# Patient Record
Sex: FEMALE | Race: NATIVE HAWAIIAN OR OTHER PACIFIC ISLANDER | NOT HISPANIC OR LATINO | URBAN - METROPOLITAN AREA
[De-identification: names, ages, dates, MRNs, and addresses within clinical notes are randomized per-mention and may not be internally consistent; named-entity substitution may affect disease eponyms.]

---

## 2023-05-18 ENCOUNTER — INPATIENT (INPATIENT)
Facility: HOSPITAL | Age: 28
LOS: 1 days | Discharge: HOME | DRG: 340 | End: 2023-05-20
Attending: SURGERY | Admitting: SURGERY
Payer: COMMERCIAL

## 2023-05-18 VITALS
HEIGHT: 66 IN | OXYGEN SATURATION: 96 % | DIASTOLIC BLOOD PRESSURE: 86 MMHG | SYSTOLIC BLOOD PRESSURE: 141 MMHG | TEMPERATURE: 100 F | HEART RATE: 90 BPM | WEIGHT: 169.98 LBS | RESPIRATION RATE: 18 BRPM

## 2023-05-18 LAB
ALBUMIN SERPL ELPH-MCNC: 4.8 G/DL — SIGNIFICANT CHANGE UP (ref 3.3–5)
ALP SERPL-CCNC: 59 U/L — SIGNIFICANT CHANGE UP (ref 40–120)
ALT FLD-CCNC: 19 U/L — SIGNIFICANT CHANGE UP (ref 10–45)
ANION GAP SERPL CALC-SCNC: 15 MMOL/L — SIGNIFICANT CHANGE UP (ref 5–17)
ANISOCYTOSIS BLD QL: SLIGHT — SIGNIFICANT CHANGE UP
APPEARANCE UR: CLEAR — SIGNIFICANT CHANGE UP
APTT BLD: 25.6 SEC — LOW (ref 27.5–35.5)
AST SERPL-CCNC: 16 U/L — SIGNIFICANT CHANGE UP (ref 10–40)
BACTERIA # UR AUTO: PRESENT /HPF
BASOPHILS # BLD AUTO: 0 K/UL — SIGNIFICANT CHANGE UP (ref 0–0.2)
BASOPHILS NFR BLD AUTO: 0 % — SIGNIFICANT CHANGE UP (ref 0–2)
BILIRUB SERPL-MCNC: 0.6 MG/DL — SIGNIFICANT CHANGE UP (ref 0.2–1.2)
BILIRUB UR-MCNC: NEGATIVE — SIGNIFICANT CHANGE UP
BLD GP AB SCN SERPL QL: NEGATIVE — SIGNIFICANT CHANGE UP
BLD GP AB SCN SERPL QL: NEGATIVE — SIGNIFICANT CHANGE UP
BUN SERPL-MCNC: 11 MG/DL — SIGNIFICANT CHANGE UP (ref 7–23)
CALCIUM SERPL-MCNC: 9.2 MG/DL — SIGNIFICANT CHANGE UP (ref 8.4–10.5)
CHLORIDE SERPL-SCNC: 99 MMOL/L — SIGNIFICANT CHANGE UP (ref 96–108)
CO2 SERPL-SCNC: 25 MMOL/L — SIGNIFICANT CHANGE UP (ref 22–31)
COLOR SPEC: YELLOW — SIGNIFICANT CHANGE UP
COMMENT - URINE: SIGNIFICANT CHANGE UP
CREAT SERPL-MCNC: 0.55 MG/DL — SIGNIFICANT CHANGE UP (ref 0.5–1.3)
DIFF PNL FLD: ABNORMAL
EGFR: 127 ML/MIN/1.73M2 — SIGNIFICANT CHANGE UP
EOSINOPHIL # BLD AUTO: 0 K/UL — SIGNIFICANT CHANGE UP (ref 0–0.5)
EOSINOPHIL NFR BLD AUTO: 0 % — SIGNIFICANT CHANGE UP (ref 0–6)
EPI CELLS # UR: SIGNIFICANT CHANGE UP /HPF (ref 0–5)
GLUCOSE SERPL-MCNC: 126 MG/DL — HIGH (ref 70–99)
GLUCOSE UR QL: NEGATIVE — SIGNIFICANT CHANGE UP
HCT VFR BLD CALC: 42 % — SIGNIFICANT CHANGE UP (ref 34.5–45)
HGB BLD-MCNC: 14.2 G/DL — SIGNIFICANT CHANGE UP (ref 11.5–15.5)
HYALINE CASTS # UR AUTO: SIGNIFICANT CHANGE UP /LPF (ref 0–2)
HYPOCHROMIA BLD QL: SLIGHT — SIGNIFICANT CHANGE UP
INR BLD: 1.21 — HIGH (ref 0.88–1.16)
KETONES UR-MCNC: >=80 MG/DL
LACTATE SERPL-SCNC: 1.5 MMOL/L — SIGNIFICANT CHANGE UP (ref 0.5–2)
LEUKOCYTE ESTERASE UR-ACNC: NEGATIVE — SIGNIFICANT CHANGE UP
LIDOCAIN IGE QN: 23 U/L — SIGNIFICANT CHANGE UP (ref 7–60)
LYMPHOCYTES # BLD AUTO: 0.24 K/UL — LOW (ref 1–3.3)
LYMPHOCYTES # BLD AUTO: 0.9 % — LOW (ref 13–44)
MAGNESIUM SERPL-MCNC: 1.5 MG/DL — LOW (ref 1.6–2.6)
MANUAL SMEAR VERIFICATION: SIGNIFICANT CHANGE UP
MCHC RBC-ENTMCNC: 29.9 PG — SIGNIFICANT CHANGE UP (ref 27–34)
MCHC RBC-ENTMCNC: 33.8 GM/DL — SIGNIFICANT CHANGE UP (ref 32–36)
MCV RBC AUTO: 88.4 FL — SIGNIFICANT CHANGE UP (ref 80–100)
MICROCYTES BLD QL: SLIGHT — SIGNIFICANT CHANGE UP
MONOCYTES # BLD AUTO: 0.9 K/UL — SIGNIFICANT CHANGE UP (ref 0–0.9)
MONOCYTES NFR BLD AUTO: 3.4 % — SIGNIFICANT CHANGE UP (ref 2–14)
NEUTROPHILS # BLD AUTO: 24.61 K/UL — HIGH (ref 1.8–7.4)
NEUTROPHILS NFR BLD AUTO: 93.1 % — HIGH (ref 43–77)
NITRITE UR-MCNC: NEGATIVE — SIGNIFICANT CHANGE UP
OVALOCYTES BLD QL SMEAR: SLIGHT — SIGNIFICANT CHANGE UP
PH UR: 6 — SIGNIFICANT CHANGE UP (ref 5–8)
PLAT MORPH BLD: ABNORMAL
PLATELET # BLD AUTO: 302 K/UL — SIGNIFICANT CHANGE UP (ref 150–400)
POIKILOCYTOSIS BLD QL AUTO: SLIGHT — SIGNIFICANT CHANGE UP
POTASSIUM SERPL-MCNC: 3.4 MMOL/L — LOW (ref 3.5–5.3)
POTASSIUM SERPL-SCNC: 3.4 MMOL/L — LOW (ref 3.5–5.3)
PROT SERPL-MCNC: 8.1 G/DL — SIGNIFICANT CHANGE UP (ref 6–8.3)
PROT UR-MCNC: 100 MG/DL
PROTHROM AB SERPL-ACNC: 14.4 SEC — HIGH (ref 10.5–13.4)
RBC # BLD: 4.75 M/UL — SIGNIFICANT CHANGE UP (ref 3.8–5.2)
RBC # FLD: 11.9 % — SIGNIFICANT CHANGE UP (ref 10.3–14.5)
RBC BLD AUTO: ABNORMAL
RBC CASTS # UR COMP ASSIST: < 5 /HPF — SIGNIFICANT CHANGE UP
RH IG SCN BLD-IMP: POSITIVE — SIGNIFICANT CHANGE UP
RH IG SCN BLD-IMP: POSITIVE — SIGNIFICANT CHANGE UP
ROULEAUX BLD QL SMEAR: PRESENT
SODIUM SERPL-SCNC: 139 MMOL/L — SIGNIFICANT CHANGE UP (ref 135–145)
SP GR SPEC: >=1.03 — SIGNIFICANT CHANGE UP (ref 1–1.03)
UROBILINOGEN FLD QL: 0.2 E.U./DL — SIGNIFICANT CHANGE UP
VARIANT LYMPHS # BLD: 2.6 % — SIGNIFICANT CHANGE UP (ref 0–6)
WBC # BLD: 26.43 K/UL — HIGH (ref 3.8–10.5)
WBC # FLD AUTO: 26.43 K/UL — HIGH (ref 3.8–10.5)
WBC UR QL: < 5 /HPF — SIGNIFICANT CHANGE UP

## 2023-05-18 PROCEDURE — 44970 LAPAROSCOPY APPENDECTOMY: CPT

## 2023-05-18 PROCEDURE — 99221 1ST HOSP IP/OBS SF/LOW 40: CPT | Mod: 57

## 2023-05-18 PROCEDURE — 99285 EMERGENCY DEPT VISIT HI MDM: CPT

## 2023-05-18 PROCEDURE — G1004: CPT

## 2023-05-18 PROCEDURE — 74177 CT ABD & PELVIS W/CONTRAST: CPT | Mod: 26,ME

## 2023-05-18 PROCEDURE — 88304 TISSUE EXAM BY PATHOLOGIST: CPT | Mod: 26

## 2023-05-18 DEVICE — STAPLER COVIDIEN TRI-STAPLE 60MM PURPLE RELOAD: Type: IMPLANTABLE DEVICE | Status: FUNCTIONAL

## 2023-05-18 RX ORDER — HEPARIN SODIUM 5000 [USP'U]/ML
5000 INJECTION INTRAVENOUS; SUBCUTANEOUS EVERY 8 HOURS
Refills: 0 | Status: DISCONTINUED | OUTPATIENT
Start: 2023-05-19 | End: 2023-05-20

## 2023-05-18 RX ORDER — PIPERACILLIN AND TAZOBACTAM 4; .5 G/20ML; G/20ML
3.38 INJECTION, POWDER, LYOPHILIZED, FOR SOLUTION INTRAVENOUS EVERY 8 HOURS
Refills: 0 | Status: DISCONTINUED | OUTPATIENT
Start: 2023-05-18 | End: 2023-05-20

## 2023-05-18 RX ORDER — SODIUM CHLORIDE 9 MG/ML
1000 INJECTION INTRAMUSCULAR; INTRAVENOUS; SUBCUTANEOUS ONCE
Refills: 0 | Status: COMPLETED | OUTPATIENT
Start: 2023-05-18 | End: 2023-05-18

## 2023-05-18 RX ORDER — HEPARIN SODIUM 5000 [USP'U]/ML
5000 INJECTION INTRAVENOUS; SUBCUTANEOUS EVERY 8 HOURS
Refills: 0 | Status: DISCONTINUED | OUTPATIENT
Start: 2023-05-18 | End: 2023-05-18

## 2023-05-18 RX ORDER — HYDROMORPHONE HYDROCHLORIDE 2 MG/ML
0.5 INJECTION INTRAMUSCULAR; INTRAVENOUS; SUBCUTANEOUS ONCE
Refills: 0 | Status: DISCONTINUED | OUTPATIENT
Start: 2023-05-18 | End: 2023-05-18

## 2023-05-18 RX ORDER — SODIUM CHLORIDE 9 MG/ML
1000 INJECTION, SOLUTION INTRAVENOUS ONCE
Refills: 0 | Status: COMPLETED | OUTPATIENT
Start: 2023-05-18 | End: 2023-05-18

## 2023-05-18 RX ORDER — ACETAMINOPHEN 500 MG
1000 TABLET ORAL ONCE
Refills: 0 | Status: COMPLETED | OUTPATIENT
Start: 2023-05-18 | End: 2023-05-18

## 2023-05-18 RX ORDER — MORPHINE SULFATE 50 MG/1
4 CAPSULE, EXTENDED RELEASE ORAL ONCE
Refills: 0 | Status: DISCONTINUED | OUTPATIENT
Start: 2023-05-18 | End: 2023-05-18

## 2023-05-18 RX ORDER — SODIUM CHLORIDE 9 MG/ML
1000 INJECTION, SOLUTION INTRAVENOUS
Refills: 0 | Status: DISCONTINUED | OUTPATIENT
Start: 2023-05-18 | End: 2023-05-18

## 2023-05-18 RX ORDER — ONDANSETRON 8 MG/1
4 TABLET, FILM COATED ORAL EVERY 6 HOURS
Refills: 0 | Status: DISCONTINUED | OUTPATIENT
Start: 2023-05-18 | End: 2023-05-18

## 2023-05-18 RX ORDER — OXYCODONE HYDROCHLORIDE 5 MG/1
5 TABLET ORAL EVERY 6 HOURS
Refills: 0 | Status: DISCONTINUED | OUTPATIENT
Start: 2023-05-18 | End: 2023-05-20

## 2023-05-18 RX ORDER — ONDANSETRON 8 MG/1
4 TABLET, FILM COATED ORAL EVERY 6 HOURS
Refills: 0 | Status: DISCONTINUED | OUTPATIENT
Start: 2023-05-18 | End: 2023-05-20

## 2023-05-18 RX ORDER — ACETAMINOPHEN 500 MG
650 TABLET ORAL EVERY 6 HOURS
Refills: 0 | Status: DISCONTINUED | OUTPATIENT
Start: 2023-05-19 | End: 2023-05-20

## 2023-05-18 RX ORDER — PIPERACILLIN AND TAZOBACTAM 4; .5 G/20ML; G/20ML
3.38 INJECTION, POWDER, LYOPHILIZED, FOR SOLUTION INTRAVENOUS ONCE
Refills: 0 | Status: COMPLETED | OUTPATIENT
Start: 2023-05-18 | End: 2023-05-18

## 2023-05-18 RX ORDER — MAGNESIUM SULFATE 500 MG/ML
2 VIAL (ML) INJECTION ONCE
Refills: 0 | Status: COMPLETED | OUTPATIENT
Start: 2023-05-18 | End: 2023-05-18

## 2023-05-18 RX ORDER — ONDANSETRON 8 MG/1
4 TABLET, FILM COATED ORAL ONCE
Refills: 0 | Status: COMPLETED | OUTPATIENT
Start: 2023-05-18 | End: 2023-05-18

## 2023-05-18 RX ORDER — PIPERACILLIN AND TAZOBACTAM 4; .5 G/20ML; G/20ML
3.38 INJECTION, POWDER, LYOPHILIZED, FOR SOLUTION INTRAVENOUS EVERY 8 HOURS
Refills: 0 | Status: CANCELLED | OUTPATIENT
Start: 2023-05-19 | End: 2023-05-18

## 2023-05-18 RX ADMIN — SODIUM CHLORIDE 1000 MILLILITER(S): 9 INJECTION, SOLUTION INTRAVENOUS at 17:33

## 2023-05-18 RX ADMIN — SODIUM CHLORIDE 1000 MILLILITER(S): 9 INJECTION INTRAMUSCULAR; INTRAVENOUS; SUBCUTANEOUS at 15:03

## 2023-05-18 RX ADMIN — Medication 25 GRAM(S): at 15:49

## 2023-05-18 RX ADMIN — SODIUM CHLORIDE 120 MILLILITER(S): 9 INJECTION, SOLUTION INTRAVENOUS at 17:33

## 2023-05-18 RX ADMIN — Medication 400 MILLIGRAM(S): at 17:47

## 2023-05-18 RX ADMIN — ONDANSETRON 4 MILLIGRAM(S): 8 TABLET, FILM COATED ORAL at 14:55

## 2023-05-18 RX ADMIN — MORPHINE SULFATE 4 MILLIGRAM(S): 50 CAPSULE, EXTENDED RELEASE ORAL at 14:34

## 2023-05-18 RX ADMIN — MORPHINE SULFATE 4 MILLIGRAM(S): 50 CAPSULE, EXTENDED RELEASE ORAL at 14:16

## 2023-05-18 RX ADMIN — PIPERACILLIN AND TAZOBACTAM 25 GRAM(S): 4; .5 INJECTION, POWDER, LYOPHILIZED, FOR SOLUTION INTRAVENOUS at 22:45

## 2023-05-18 RX ADMIN — HYDROMORPHONE HYDROCHLORIDE 0.5 MILLIGRAM(S): 2 INJECTION INTRAMUSCULAR; INTRAVENOUS; SUBCUTANEOUS at 17:47

## 2023-05-18 RX ADMIN — SODIUM CHLORIDE 1000 MILLILITER(S): 9 INJECTION INTRAMUSCULAR; INTRAVENOUS; SUBCUTANEOUS at 14:16

## 2023-05-18 RX ADMIN — PIPERACILLIN AND TAZOBACTAM 200 GRAM(S): 4; .5 INJECTION, POWDER, LYOPHILIZED, FOR SOLUTION INTRAVENOUS at 16:27

## 2023-05-18 NOTE — H&P ADULT - HISTORY OF PRESENT ILLNESS
28yo Female pt with no PMH/PSx presents to the ED on 5/18 with x1 day history of RLQ pain associated with nausea, vomiting, and diarrhea. Pt states that yesterday in the early afternoon she developed generalized upper abdominal pain associated with nausea and diarrhea which prompted her to leave work early. States that in the early evening, her nausea worsened and she began having multiple episodes of NBNB emesis. States that at that time her diarrhea resolved but she noticed she was no longer passing flatus. States that in the middle of the night she was woken by her pain which migrated to the RLQ and became sharp and stabbing in quality and 10/10 in severity. States she had a recorded fever of 101F associated with diaphoresis and chills. States that her mother who is a nurse, recommended she come in to the ED for further evaluation with concern for appendicitis. On presentation, pt reports that her pain remains localized to the RLQ and is still sharp and stabbing in nature. States that it is currently a 7/10 in severity (after 4mg morphine). Denies current nausea. States her last episode of emesis was in the waiting room of the ED a few hours ago. Denies further episodes of flatus or diarrhea since last night.    Last colonoscopy/EGD - Never  Denies family hx of IBS, Crohn's, or colon cancer.    In the ED, pt afebrile with low grade temp of 99.9 oral, nontachycardic, normotensive, and satting on RA. Labs significant for WBC 26.43, Hb 14.2, and an unremarkable chem panel. CTAP with PO/IV contrast demonstrates the appendix is dilated up to 1.4 cm diameter with wall thickening. Periappendiceal fat infiltration. Appears to be a 1.1 cm obstructing radiopaque appendicolith at the base of the appendix. More distally there appear to be multiple smaller radiopaque intraluminal appendicoliths as well as air within the terminal appendix. The appendix is situated retrocecal. No macro perforation. No free intraperitoneal air.    PMH: None  PSx: None  Social Hx: Never smoker, social Etoh, occassional marijuana  Family Hx: Materal grandmother with Ulcerative colitis    Meds: None

## 2023-05-18 NOTE — BRIEF OPERATIVE NOTE - OPERATION/FINDINGS
Patient was placed under general anesthesia and laparoscopic access obtained by supraumbilical midline cutdown with two other ports on the left and right abdomen. Appendix identified (non-perforated, gangrenous). Cecum bluntly mobilized. Mesoappendix divided using tan EndoGIA stapler. Appendix amputated at base near cecum using purple EndoGIA stapler. Stump inspected laparoscopically. Appendix was removed without incident and fascia closed w/ Vicryl sutures. Appendix gangrenous, no christoph perforation, likely microperforation (complicated appendicitis).

## 2023-05-18 NOTE — ED PROVIDER NOTE - OBJECTIVE STATEMENT
27-year-old female without significant medical history yet developed abdominal pain yesterday afternoon with multiple episodes of watery nonbloody diarrhea.  Pain was initially located in the periumbilical region but during the night it migrated down to the right lower quadrant.  She has no appetite today and she has vomited nonbloody none bilious emesis a couple of times.  She has only had 1 episode of diarrhea today.  She had a fever of 101 in the middle of the night.  Pain is worse with walking and the car ride to the ED was particularly painful for her when the car hit bumps.  Curling into fetal position helped relieve the pain slightly.    She is currently menstruating.    PMHx denies  PSHx denies  Meds denies  NKDA

## 2023-05-18 NOTE — ED PROVIDER NOTE - PHYSICAL EXAMINATION
CONSTITUTIONAL: NAD   SKIN: Normal color and turgor.    HEAD: NC/AT.  EYES: Conjunctiva clear. EOMI. PERRL.    ENT: Airway clear. Normal voice.   RESPIRATORY:  Normal work of breathing. Lungs CTAB.  CARDIOVASCULAR:  RRR, S1S2. No M/R/G.      GI:  Abdomen soft, very tender RLQ.  No rebound.  + Rovsing, + Psoas  : No CVAT.  MSK: Neck supple.  No LE edema or calf tenderness. No joint swelling or ROM limitation.  NEURO: Alert; CN: grossly intact. Speech clear.  DELCID. Gait steady.

## 2023-05-18 NOTE — ED PROVIDER NOTE - ATTENDING APP SHARED VISIT CONTRIBUTION OF CARE
27-year-old female without significant medical history yet developed abdominal pain yesterday afternoon with multiple episodes of watery nonbloody diarrhea.  Minimal RLQ ttp, no r/g Will get labs, UA, CT for ro appy, reassess.

## 2023-05-18 NOTE — ED ADULT TRIAGE NOTE - CHIEF COMPLAINT QUOTE
Pt co abdominal pain x1 day. Pain originally epigastric pain, now radiated to RLQ, associated w N/V/D. No abdominal surgeries, LMP 5/15.

## 2023-05-18 NOTE — ED PROVIDER NOTE - CLINICAL SUMMARY MEDICAL DECISION MAKING FREE TEXT BOX
pt with abd pain initially periumbilical, migrated to RLQ. assoc anorexia, NVD, and fever at home. suspicious for appendicitis, confirmed by CT scan. IV fluid, abx, mag repletion.

## 2023-05-18 NOTE — ED ADULT NURSE NOTE - OBJECTIVE STATEMENT
Pt reports RLQ abdominal pain w/ N/V x 24 hours. pt reports additional pain radiation to her epigastric area. Pain increases on ambulation. Denies any major medical history. Alert, oriented, and ambulatory with no mobility deficits identified.

## 2023-05-18 NOTE — CHART NOTE - NSCHARTNOTEFT_GEN_A_CORE
Procedure: laparoscopic appendectomy, gangrenous, nonperforated (5/18)  Surgeon: Dr. Deluca    S: Pt reports mild pain. NPO, -n/-v/-BF yet. Voiding adequately. VS wnl, satting well on 2L NC, normocardic. Abdomen is soft, mild appropriate ttp, incisions c/d/i. Denies CP, SOB, KLINE, calf tenderness.     O:  T(C): --  T(F): --  HR: 71 (05-19-23 @ 00:00) (68 - 97)  BP: 112/56 (05-19-23 @ 00:00) (107/57 - 122/57)  RR: 15 (05-19-23 @ 00:00) (11 - 16)  SpO2: 98% (05-19-23 @ 00:00) (96% - 98%)  Wt(kg): --                        14.2   26.43 )-----------( 302      ( 18 May 2023 13:51 )             42.0     05-18    139  |  99  |  11  ----------------------------<  126<H>  3.4<L>   |  25  |  0.55    Ca    9.2      18 May 2023 13:51  Mg     1.5     05-18    TPro  8.1  /  Alb  4.8  /  TBili  0.6  /  DBili  x   /  AST  16  /  ALT  19  /  AlkPhos  59  05-18      Gen: NAD, resting comfortably in bed  C/V: NSR  Pulm: Nonlabored breathing, no respiratory distress. Satting well on 2L NC.   Abd: soft, Mild appropriate ttp around incisions. Incisions c/d/i.   Extrem: WWP, no calf edema, SCDs in place      A/P: 27yFemale s/p above procedure  Diet: NPO/IVF  Pain/nausea control  Abx x4 days  DVT ppx: SQH, SCDs  Dispo plan: tele as per anesthesia

## 2023-05-18 NOTE — ED ADULT NURSE NOTE - NSICDXPASTSURGICALHX_GEN_ALL_CORE_FT
You can access the FollowMyHealth Patient Portal offered by [facility] by registering at the following website: [email or web address]. By joining [facility]’s FollowMyHealth portal, you will also be able to view your health information using other applications (apps) compatible with our system.
PAST SURGICAL HISTORY:  No significant past surgical history

## 2023-05-18 NOTE — H&P ADULT - ASSESSMENT
26yo Female pt with no PMH/PSx presents to the ED on 5/18 with x1 day history of RLQ pain associated with nausea, vomiting, and diarrhea.  Pt afebrile with low grade temp of 99.9 oral, nontachycardic, normotensive, and satting well on RA. Labs significant for WBC 26.43, Hb 14.2, and an unremarkable chem panel. CTAP with PO/IV contrast demonstrates acute retrocecal appendicitis.    Plan:  Admit to [facility], Team 4, Dr. Deluca  NPO/IVF  Zosyn  Pain/nausea control PRN  Home meds as appropriate  HSQ/SCDs/OOB/IS  Pre-op labs  Add on to OR for laparoscopic appendectomy

## 2023-05-18 NOTE — ED ADULT NURSE NOTE - NSFALLUNIVINTERV_ED_ALL_ED
Bed/Stretcher in lowest position, wheels locked, appropriate side rails in place/Call bell, personal items and telephone in reach/Instruct patient to call for assistance before getting out of bed/chair/stretcher/Non-slip footwear applied when patient is off stretcher/[place] to call system/Physically safe environment - no spills, clutter or unnecessary equipment/Purposeful proactive rounding/Room/bathroom lighting operational, light cord in reach

## 2023-05-18 NOTE — H&P ADULT - NSHPPHYSICALEXAM_GEN_ALL_CORE
Vital Signs Last 24 Hrs  T(C): 37.7 (18 May 2023 12:08), Max: 37.7 (18 May 2023 12:08)  T(F): 99.9 (18 May 2023 12:08), Max: 99.9 (18 May 2023 12:08)  HR: 90 (18 May 2023 12:08) (90 - 90)  BP: 141/86 (18 May 2023 12:08) (141/86 - 141/86)  BP(mean): --  RR: 18 (18 May 2023 12:08) (18 - 18)  SpO2: 96% (18 May 2023 12:08) (96% - 96%)    Parameters below as of 18 May 2023 12:08  Patient On (Oxygen Delivery Method): room air    Physical Exam  General: AAOx3, NAD, laying comfortably in bed  Cardio: S1,S2, No MRG  Pulm: Nonlabored breathing  Abdomen: soft, mildly distended, moderate RLQ TTP with +rebound, +Rovsing sign, mild guarding  Extremities: WWP, peripheral pulses appreciated

## 2023-05-18 NOTE — PRE-OP CHECKLIST - SELECT TESTS ORDERED
Type and Cross/Type and Screen/HCG/EKG/COVID-19 PT/PTT/INR/Type and Cross/Type and Screen/HCG/EKG/COVID-19

## 2023-05-18 NOTE — ED PROVIDER NOTE - NS ED ATTENDING STATEMENT MOD
This was a shared visit with the HAILEY. I reviewed and verified the documentation and independently performed the documented:

## 2023-05-18 NOTE — H&P ADULT - NSHPLABSRESULTS_GEN_ALL_CORE
LABS:                        14.2   26.43 )-----------( 302      ( 18 May 2023 13:51 )             42.0     05-18    139  |  99  |  11  ----------------------------<  126<H>  3.4<L>   |  25  |  0.55    Ca    9.2      18 May 2023 13:51  Mg     1.5     05-18    TPro  8.1  /  Alb  4.8  /  TBili  0.6  /  DBili  x   /  AST  16  /  ALT  19  /  AlkPhos  59  05-18        ACC: [id number] EXAM: CT ABDOMEN AND PELVIS OC IC ORDERED BY: ZENA SHI    PROCEDURE DATE: 05/18/2023        INTERPRETATION: CONTRAST/COMPLICATIONS:  IV Contrast: Isovue 370 90 cc administered 10 cc discarded  Oral Contrast: Omnipaque 300  Complications: None reported at time of study completion    CT of the ABDOMEN and PELVIS with intravenous contrast dated 5/18/2023 3:48 PM    INDICATION: RLQ abdominal pain, appendicitis suspected    TECHNIQUE: CT of the abdomen and pelvis was performed. Axial and coronal and sagittal images were produced and reviewed.    PRIOR STUDIES: None.    FINDINGS: Images of the lower chest demonstrate no abnormality.    The liver is normal in appearance.     No radiopaque stones are seen in the gallbladder.     The pancreas is normal in appearance.     No splenic abnormalities are seen.    The adrenal glands are unremarkable.    The kidneys appear to show some asymmetry in size with the right kidney appearing to be larger than the left. No hydronephrosis. No nephrolithiasis..    No abdominal aortic aneurysm is seen.     No lymphadenopathy is seen.    Evaluation of the bowel demonstrates that the ingested oral contrast material has reached the proximal mid small bowel. No bowel obstruction. Abnormal appendix noted. The appendix is dilated up to 1.4 cm diameter with wall thickening. Periappendiceal fat infiltration. Appears to be a 1.1 cm obstructing radiopaque appendicolith at the base of the appendix. More distally there appear to be multiple smaller radiopaque intraluminal appendicoliths as well as air within the terminal appendix. The appendix is situated retrocecal. No macro perforation. No free intraperitoneal air.    No ascites is seen.    Images of the pelvis demonstrate the uterus to be normal in appearance. 2.1 cm right ovarian follicle. Probable 2.6 cm left paraovarian cyst-contiguous with the posterior aspect of the left ovary..    Evaluation of the osseous structures demonstrates no significant abnormality.      IMPRESSION:  Acute retrocecal appendicitis. No macro perforation.

## 2023-05-19 LAB
ANION GAP SERPL CALC-SCNC: 10 MMOL/L — SIGNIFICANT CHANGE UP (ref 5–17)
BUN SERPL-MCNC: 7 MG/DL — SIGNIFICANT CHANGE UP (ref 7–23)
CALCIUM SERPL-MCNC: 8.7 MG/DL — SIGNIFICANT CHANGE UP (ref 8.4–10.5)
CHLORIDE SERPL-SCNC: 104 MMOL/L — SIGNIFICANT CHANGE UP (ref 96–108)
CO2 SERPL-SCNC: 24 MMOL/L — SIGNIFICANT CHANGE UP (ref 22–31)
CREAT SERPL-MCNC: 0.5 MG/DL — SIGNIFICANT CHANGE UP (ref 0.5–1.3)
EGFR: 130 ML/MIN/1.73M2 — SIGNIFICANT CHANGE UP
GLUCOSE SERPL-MCNC: 148 MG/DL — HIGH (ref 70–99)
HCT VFR BLD CALC: 35.9 % — SIGNIFICANT CHANGE UP (ref 34.5–45)
HGB BLD-MCNC: 12.1 G/DL — SIGNIFICANT CHANGE UP (ref 11.5–15.5)
MAGNESIUM SERPL-MCNC: 2.2 MG/DL — SIGNIFICANT CHANGE UP (ref 1.6–2.6)
MCHC RBC-ENTMCNC: 30.4 PG — SIGNIFICANT CHANGE UP (ref 27–34)
MCHC RBC-ENTMCNC: 33.7 GM/DL — SIGNIFICANT CHANGE UP (ref 32–36)
MCV RBC AUTO: 90.2 FL — SIGNIFICANT CHANGE UP (ref 80–100)
NRBC # BLD: 0 /100 WBCS — SIGNIFICANT CHANGE UP (ref 0–0)
PHOSPHATE SERPL-MCNC: 2.6 MG/DL — SIGNIFICANT CHANGE UP (ref 2.5–4.5)
PLATELET # BLD AUTO: 238 K/UL — SIGNIFICANT CHANGE UP (ref 150–400)
POTASSIUM SERPL-MCNC: 4.2 MMOL/L — SIGNIFICANT CHANGE UP (ref 3.5–5.3)
POTASSIUM SERPL-SCNC: 4.2 MMOL/L — SIGNIFICANT CHANGE UP (ref 3.5–5.3)
RBC # BLD: 3.98 M/UL — SIGNIFICANT CHANGE UP (ref 3.8–5.2)
RBC # FLD: 12.2 % — SIGNIFICANT CHANGE UP (ref 10.3–14.5)
SODIUM SERPL-SCNC: 138 MMOL/L — SIGNIFICANT CHANGE UP (ref 135–145)
WBC # BLD: 20.57 K/UL — HIGH (ref 3.8–10.5)
WBC # FLD AUTO: 20.57 K/UL — HIGH (ref 3.8–10.5)

## 2023-05-19 RX ADMIN — OXYCODONE HYDROCHLORIDE 5 MILLIGRAM(S): 5 TABLET ORAL at 16:30

## 2023-05-19 RX ADMIN — OXYCODONE HYDROCHLORIDE 5 MILLIGRAM(S): 5 TABLET ORAL at 16:00

## 2023-05-19 RX ADMIN — HEPARIN SODIUM 5000 UNIT(S): 5000 INJECTION INTRAVENOUS; SUBCUTANEOUS at 18:56

## 2023-05-19 RX ADMIN — PIPERACILLIN AND TAZOBACTAM 25 GRAM(S): 4; .5 INJECTION, POWDER, LYOPHILIZED, FOR SOLUTION INTRAVENOUS at 23:03

## 2023-05-19 RX ADMIN — PIPERACILLIN AND TAZOBACTAM 25 GRAM(S): 4; .5 INJECTION, POWDER, LYOPHILIZED, FOR SOLUTION INTRAVENOUS at 06:18

## 2023-05-19 RX ADMIN — Medication 650 MILLIGRAM(S): at 07:21

## 2023-05-19 RX ADMIN — HEPARIN SODIUM 5000 UNIT(S): 5000 INJECTION INTRAVENOUS; SUBCUTANEOUS at 11:06

## 2023-05-19 RX ADMIN — OXYCODONE HYDROCHLORIDE 5 MILLIGRAM(S): 5 TABLET ORAL at 23:07

## 2023-05-19 RX ADMIN — PIPERACILLIN AND TAZOBACTAM 25 GRAM(S): 4; .5 INJECTION, POWDER, LYOPHILIZED, FOR SOLUTION INTRAVENOUS at 16:00

## 2023-05-19 RX ADMIN — OXYCODONE HYDROCHLORIDE 5 MILLIGRAM(S): 5 TABLET ORAL at 22:07

## 2023-05-19 NOTE — DISCHARGE NOTE PROVIDER - HOSPITAL COURSE
26yo Female pt with no PMH/PSx presented to the ED on 5/18 with x1 day history of RLQ pain associated with nausea, vomiting, and diarrhea.  Pt afebrile with low grade temp of 99.9 oral, nontachycardic, normotensive, and satting well on RA. Labs significant for WBC 26.43, Hb 14.2, and an unremarkable chem panel. CTAP with PO/IV contrast demonstrated acute retrocecal appendicitis. On 5/18 pt was taken to the OR for lap appy (gangrenous) and stayed I n the hospital on POD1 for IV abx. The res of her postoperative course was unremarkable with advancement of diet, passing trial of void, and pain control. On day of discharge patient was stable to be d/c'd home. 28yo Female pt with no PMH/PSx presented to the ED on 5/18 with x1 day history of RLQ pain associated with nausea, vomiting, and diarrhea.  Pt afebrile with low grade temp of 99.9 oral, nontachycardic, normotensive, and satting well on RA. Labs significant for WBC 26.43, Hb 14.2, and an unremarkable chem panel. CTAP with PO/IV contrast demonstrated acute retrocecal appendicitis. On 5/18 pt was taken to the OR for lap appy (gangrenous) and stayed I n the hospital on POD1 for IV abx. Patient's post-operative course was unremarkable. Diet was advanced as tolerated and pain was well controlled on medication. On day of discharge, patient had stable vital signs, was tolerating diet, voiding without assistance, and pain was controlled. The patient has met benchmarks for discharge on Augmentin with plans to follow up in 1-2 weeks. 28yo Female pt with no PMH/PSx presented to the ED on 5/18 with x1 day history of RLQ pain associated with nausea, vomiting, and diarrhea.  Pt afebrile with low grade temp of 99.9 oral, nontachycardic, normotensive, and satting well on RA. Labs significant for WBC 26.43, Hb 14.2, and an unremarkable chem panel. CTAP with PO/IV contrast demonstrated acute retrocecal appendicitis. On 5/18 pt was taken to the OR for lap appendectomy (gangrenous) and stayed I n the hospital on POD1 for IV abx. Patient's post-operative course was unremarkable. Diet was advanced as tolerated and pain was well controlled on medication. On day of discharge, patient had stable vital signs, was tolerating diet, voiding without assistance, and pain was controlled. The patient has met benchmarks for discharge on Augmentin with plans to follow up in 1-2 weeks.

## 2023-05-19 NOTE — DISCHARGE NOTE PROVIDER - NSDCFUADDAPPT_GEN_ALL_CORE_FT
Please follow up with Dr. Deluca in one week; you may call the office to make an appointment at your earliest convenience at [phone].

## 2023-05-19 NOTE — DISCHARGE NOTE PROVIDER - CARE PROVIDER_API CALL
Jemima Deluca)  Surgery  [address] [address]  Phone: [phone]  Fax: [phone]  Follow Up Time:

## 2023-05-19 NOTE — DISCHARGE NOTE PROVIDER - NSDCACTIVITY_GEN_ALL_CORE
Stairs allowed/Walking - Indoors allowed/No heavy lifting/straining/Walking - Outdoors allowed/Follow Instructions Provided by your Surgical Team Showering allowed/Stairs allowed/Walking - Indoors allowed/No heavy lifting/straining/Walking - Outdoors allowed/Follow Instructions Provided by your Surgical Team

## 2023-05-19 NOTE — PATIENT PROFILE ADULT - FALL HARM RISK - HARM RISK INTERVENTIONS

## 2023-05-19 NOTE — DISCHARGE NOTE PROVIDER - NSDCFUADDINST_GEN_ALL_CORE_FT
Antibiotics:  You have been prescribed oral antibiotics. Please be sure to complete the entire course as directed.    Pain Control:  Take 2 tabs tylenol every 6 hours as needed for pain management. You have also been prescribed oxycodone for pain not controlled by tylenol. Take 1 tabs as prescribed for severe pain. Take prescribed stool softener (colace) to prevent constipation caused by oxycodone    General Discharge Instructions:  Please resume all regular home medications unless specifically advised not to take a particular medication. Also, please take any new medications as prescribed.  Please get plenty of rest, continue to ambulate several times per day, and drink adequate amounts of fluids. Avoid lifting weights greater than 5-10 lbs until you follow-up with your surgeon, who will instruct you further regarding activity restrictions.  Avoid driving or operating heavy machinery while taking pain medications.  Please follow-up with your surgeon and Primary Care Provider (PCP) as advised.  Incision Care:  *Please call your doctor if you have increased pain, swelling, redness, or drainage from the incision site.  *Avoid swimming and baths until your follow-up appointment.  *You may shower, and wash surgical incisions with a mild soap and warm water. Gently pat the area dry.    Warning Signs:  Please call your doctor if you experience the following:  *You experience new chest pain, pressure, squeezing or tightness.  *New or worsening cough, shortness of breath, or wheeze.  *If you are vomiting and cannot keep down fluids or your medications.  *You are getting dehydrated due to continued vomiting, diarrhea, or other reasons. Signs of dehydration include dry mouth, rapid heartbeat, or feeling dizzy or faint when standing.  *You see blood or dark/black material when you vomit or have a bowel movement.  *You experience burning when you urinate, have blood in your urine, or experience a discharge.  *Your pain is not improving within 8-12 hours or is not gone within 24 hours. Call or return immediately if your pain is getting worse, changes location, or moves to your chest or back.  *You have shaking chills, or fever greater than 101.5 degrees Fahrenheit or 38 degrees Celsius.  *Any change in your symptoms, or any new symptoms that concern you.     Antibiotics:  You have been prescribed oral antibiotics. Please be sure to complete the entire course as directed.    Pain Control:  Take 2 tabs tylenol every 6 hours as needed for pain management.    General Discharge Instructions:  Please resume all regular home medications unless specifically advised not to take a particular medication. Also, please take any new medications as prescribed.  Please get plenty of rest, continue to ambulate several times per day, and drink adequate amounts of fluids. Avoid lifting weights greater than 5-10 lbs until you follow-up with your surgeon, who will instruct you further regarding activity restrictions.  Avoid driving or operating heavy machinery while taking pain medications.  Please follow-up with your surgeon and Primary Care Provider (PCP) as advised.  Incision Care:  *Please call your doctor if you have increased pain, swelling, redness, or drainage from the incision site.  *Avoid swimming and baths until your follow-up appointment.  *You may shower, and wash surgical incisions with a mild soap and warm water. Gently pat the area dry.    Warning Signs:  Please call your doctor if you experience the following:  *You experience new chest pain, pressure, squeezing or tightness.  *New or worsening cough, shortness of breath, or wheeze.  *If you are vomiting and cannot keep down fluids or your medications.  *You are getting dehydrated due to continued vomiting, diarrhea, or other reasons. Signs of dehydration include dry mouth, rapid heartbeat, or feeling dizzy or faint when standing.  *You see blood or dark/black material when you vomit or have a bowel movement.  *You experience burning when you urinate, have blood in your urine, or experience a discharge.  *Your pain is not improving within 8-12 hours or is not gone within 24 hours. Call or return immediately if your pain is getting worse, changes location, or moves to your chest or back.  *You have shaking chills, or fever greater than 101.5 degrees Fahrenheit or 38 degrees Celsius.  *Any change in your symptoms, or any new symptoms that concern you.

## 2023-05-19 NOTE — PATIENT PROFILE ADULT - FUNCTIONAL ASSESSMENT - BASIC MOBILITY 6.
4-calculated by average/Not able to assess (calculate score using [facility] averaging method)

## 2023-05-19 NOTE — PROGRESS NOTE ADULT - SUBJECTIVE AND OBJECTIVE BOX
STATUS POST:  Laparoscopic appendectomy    POST OPERATIVE DAY #: 1    SUBJECTIVE: Pt seen and examined by chief resident. Pt is doing well, resting comfortably on bed. Reporting some abdominal soreness. Tolerated some apple juice. No nausea or vomiting.     Vital Signs Last 24 Hrs  T(C): 36.8 (19 May 2023 06:00), Max: 38.6 (18 May 2023 17:48)  T(F): 98.2 (19 May 2023 06:00), Max: 101.4 (18 May 2023 17:48)  HR: 76 (19 May 2023 06:00) (68 - 107)  BP: 128/74 (19 May 2023 06:00) (95/52 - 141/86)  BP(mean): 94 (19 May 2023 06:00) (73 - 94)  RR: 25 (19 May 2023 06:00) (10 - 25)  SpO2: 98% (19 May 2023 06:00) (96% - 99%)    Parameters below as of 19 May 2023 06:00  Patient On (Oxygen Delivery Method): room air        I&O's Summary    18 May 2023 07:01  -  19 May 2023 07:00  --------------------------------------------------------  IN: 150 mL / OUT: 900 mL / NET: -750 mL        Physical Exam:  General Appearance: Appears well, NAD  Pulmonary: Nonlabored breathing, no respiratory distress  Abdomen: Soft, nondisteded, appropriate incisional tenderness, incisions clean and dry and intact  Extremities: WWP, SCD's in place     LABS:                        12.1   20.57 )-----------( 238      ( 19 May 2023 05:28 )             35.9     05-19    138  |  104  |  7   ----------------------------<  148<H>  4.2   |  24  |  0.50    Ca    8.7      19 May 2023 05:28  Phos  2.6     05-19  Mg     2.2     05-19    TPro  8.1  /  Alb  4.8  /  TBili  0.6  /  DBili  x   /  AST  16  /  ALT  19  /  AlkPhos  59  05-18    PT/INR - ( 18 May 2023 16:10 )   PT: 14.4 sec;   INR: 1.21          PTT - ( 18 May 2023 16:10 )  PTT:25.6 sec  Urinalysis Basic - ( 18 May 2023 13:51 )    Color: Yellow / Appearance: Clear / SG: >=1.030 / pH: x  Gluc: x / Ketone: >=80 mg/dL  / Bili: Negative / Urobili: 0.2 E.U./dL   Blood: x / Protein: 100 mg/dL / Nitrite: NEGATIVE   Leuk Esterase: NEGATIVE / RBC: < 5 /HPF / WBC < 5 /HPF   Sq Epi: x / Non Sq Epi: x / Bacteria: Present /HPF

## 2023-05-20 VITALS
SYSTOLIC BLOOD PRESSURE: 94 MMHG | DIASTOLIC BLOOD PRESSURE: 66 MMHG | TEMPERATURE: 98 F | HEART RATE: 75 BPM | OXYGEN SATURATION: 96 % | RESPIRATION RATE: 17 BRPM

## 2023-05-20 LAB
ANION GAP SERPL CALC-SCNC: 7 MMOL/L — SIGNIFICANT CHANGE UP (ref 5–17)
BUN SERPL-MCNC: 7 MG/DL — SIGNIFICANT CHANGE UP (ref 7–23)
CALCIUM SERPL-MCNC: 8.8 MG/DL — SIGNIFICANT CHANGE UP (ref 8.4–10.5)
CHLORIDE SERPL-SCNC: 107 MMOL/L — SIGNIFICANT CHANGE UP (ref 96–108)
CO2 SERPL-SCNC: 23 MMOL/L — SIGNIFICANT CHANGE UP (ref 22–31)
CREAT SERPL-MCNC: 0.59 MG/DL — SIGNIFICANT CHANGE UP (ref 0.5–1.3)
EGFR: 125 ML/MIN/1.73M2 — SIGNIFICANT CHANGE UP
GLUCOSE SERPL-MCNC: 97 MG/DL — SIGNIFICANT CHANGE UP (ref 70–99)
HCT VFR BLD CALC: 37.9 % — SIGNIFICANT CHANGE UP (ref 34.5–45)
HGB BLD-MCNC: 12.2 G/DL — SIGNIFICANT CHANGE UP (ref 11.5–15.5)
MAGNESIUM SERPL-MCNC: 1.9 MG/DL — SIGNIFICANT CHANGE UP (ref 1.6–2.6)
MCHC RBC-ENTMCNC: 30 PG — SIGNIFICANT CHANGE UP (ref 27–34)
MCHC RBC-ENTMCNC: 32.2 GM/DL — SIGNIFICANT CHANGE UP (ref 32–36)
MCV RBC AUTO: 93.1 FL — SIGNIFICANT CHANGE UP (ref 80–100)
NRBC # BLD: 0 /100 WBCS — SIGNIFICANT CHANGE UP (ref 0–0)
PHOSPHATE SERPL-MCNC: 2.3 MG/DL — LOW (ref 2.5–4.5)
PLATELET # BLD AUTO: 254 K/UL — SIGNIFICANT CHANGE UP (ref 150–400)
POTASSIUM SERPL-MCNC: 4 MMOL/L — SIGNIFICANT CHANGE UP (ref 3.5–5.3)
POTASSIUM SERPL-SCNC: 4 MMOL/L — SIGNIFICANT CHANGE UP (ref 3.5–5.3)
RBC # BLD: 4.07 M/UL — SIGNIFICANT CHANGE UP (ref 3.8–5.2)
RBC # FLD: 12.7 % — SIGNIFICANT CHANGE UP (ref 10.3–14.5)
SODIUM SERPL-SCNC: 137 MMOL/L — SIGNIFICANT CHANGE UP (ref 135–145)
WBC # BLD: 9.82 K/UL — SIGNIFICANT CHANGE UP (ref 3.8–10.5)
WBC # FLD AUTO: 9.82 K/UL — SIGNIFICANT CHANGE UP (ref 3.8–10.5)

## 2023-05-20 PROCEDURE — C1889: CPT

## 2023-05-20 PROCEDURE — 88304 TISSUE EXAM BY PATHOLOGIST: CPT

## 2023-05-20 PROCEDURE — 83690 ASSAY OF LIPASE: CPT

## 2023-05-20 PROCEDURE — 99285 EMERGENCY DEPT VISIT HI MDM: CPT | Mod: 25

## 2023-05-20 PROCEDURE — 81001 URINALYSIS AUTO W/SCOPE: CPT

## 2023-05-20 PROCEDURE — 85025 COMPLETE CBC W/AUTO DIFF WBC: CPT

## 2023-05-20 PROCEDURE — 99231 SBSQ HOSP IP/OBS SF/LOW 25: CPT

## 2023-05-20 PROCEDURE — 83605 ASSAY OF LACTIC ACID: CPT

## 2023-05-20 PROCEDURE — 83735 ASSAY OF MAGNESIUM: CPT

## 2023-05-20 PROCEDURE — 86900 BLOOD TYPING SEROLOGIC ABO: CPT

## 2023-05-20 PROCEDURE — 84100 ASSAY OF PHOSPHORUS: CPT

## 2023-05-20 PROCEDURE — 85027 COMPLETE CBC AUTOMATED: CPT

## 2023-05-20 PROCEDURE — 85610 PROTHROMBIN TIME: CPT

## 2023-05-20 PROCEDURE — 80053 COMPREHEN METABOLIC PANEL: CPT

## 2023-05-20 PROCEDURE — 74177 CT ABD & PELVIS W/CONTRAST: CPT | Mod: ME

## 2023-05-20 PROCEDURE — 85730 THROMBOPLASTIN TIME PARTIAL: CPT

## 2023-05-20 PROCEDURE — 86901 BLOOD TYPING SEROLOGIC RH(D): CPT

## 2023-05-20 PROCEDURE — 96374 THER/PROPH/DIAG INJ IV PUSH: CPT

## 2023-05-20 PROCEDURE — 36415 COLL VENOUS BLD VENIPUNCTURE: CPT

## 2023-05-20 PROCEDURE — 86850 RBC ANTIBODY SCREEN: CPT

## 2023-05-20 PROCEDURE — G1004: CPT

## 2023-05-20 PROCEDURE — 80048 BASIC METABOLIC PNL TOTAL CA: CPT

## 2023-05-20 PROCEDURE — 96375 TX/PRO/DX INJ NEW DRUG ADDON: CPT

## 2023-05-20 PROCEDURE — 87040 BLOOD CULTURE FOR BACTERIA: CPT

## 2023-05-20 RX ORDER — SODIUM,POTASSIUM PHOSPHATES 278-250MG
1 POWDER IN PACKET (EA) ORAL ONCE
Refills: 0 | Status: COMPLETED | OUTPATIENT
Start: 2023-05-20 | End: 2023-05-20

## 2023-05-20 RX ADMIN — Medication 1 PACKET(S): at 12:03

## 2023-05-20 RX ADMIN — OXYCODONE HYDROCHLORIDE 5 MILLIGRAM(S): 5 TABLET ORAL at 05:14

## 2023-05-20 RX ADMIN — PIPERACILLIN AND TAZOBACTAM 25 GRAM(S): 4; .5 INJECTION, POWDER, LYOPHILIZED, FOR SOLUTION INTRAVENOUS at 06:05

## 2023-05-20 RX ADMIN — Medication 650 MILLIGRAM(S): at 08:45

## 2023-05-20 RX ADMIN — Medication 650 MILLIGRAM(S): at 14:10

## 2023-05-20 RX ADMIN — Medication 650 MILLIGRAM(S): at 08:10

## 2023-05-20 RX ADMIN — OXYCODONE HYDROCHLORIDE 5 MILLIGRAM(S): 5 TABLET ORAL at 04:14

## 2023-05-20 RX ADMIN — HEPARIN SODIUM 5000 UNIT(S): 5000 INJECTION INTRAVENOUS; SUBCUTANEOUS at 10:55

## 2023-05-20 RX ADMIN — HEPARIN SODIUM 5000 UNIT(S): 5000 INJECTION INTRAVENOUS; SUBCUTANEOUS at 01:09

## 2023-05-20 RX ADMIN — Medication 650 MILLIGRAM(S): at 14:40

## 2023-05-20 NOTE — CONSULT NOTE ADULT - SUBJECTIVE AND OBJECTIVE BOX
Patient is a 27y old  Female who presents with a chief complaint of Appendicitis (20 May 2023 07:04)    INTERVAL EVENTS:    SUBJECTIVE:  Patient was seen and examined at bedside. Patient POD2, reports abdominal pain resolved, denies N/V, passing flatus, no BM. Denies chest pain, no SOB, no orthopnea, denies palpitations. Noted with borderline BP, denies any symptoms. No other complaints or events reported    Review of systems: No fever, chills, dizziness, HA, Changes in vision, CP, dyspnea, nausea or vomiting, dysuria, changes in bowel movements, LE edema. Rest of 12 point Review of systems negative unless otherwise documented elsewhere in note.     Diet, Regular (05-18-23 @ 20:39) [Active]      MEDICATIONS:  MEDICATIONS  (STANDING):  heparin   Injectable 5000 Unit(s) SubCutaneous every 8 hours  piperacillin/tazobactam IVPB.. 3.375 Gram(s) IV Intermittent every 8 hours    MEDICATIONS  (PRN):  acetaminophen     Tablet .. 650 milliGRAM(s) Oral every 6 hours PRN Mild Pain (1 - 3)  ondansetron Injectable 4 milliGRAM(s) IV Push every 6 hours PRN Nausea  oxyCODONE    IR 5 milliGRAM(s) Oral every 6 hours PRN Moderate Pain (4 - 6)      Allergies    No Known Drug Allergies  latex (Hives)    Intolerances        OBJECTIVE:  Vital Signs Last 24 Hrs  T(C): 36.5 (20 May 2023 10:59), Max: 37.4 (19 May 2023 20:10)  T(F): 97.7 (20 May 2023 10:59), Max: 99.3 (19 May 2023 20:10)  HR: 75 (20 May 2023 10:59) (62 - 93)  BP: 94/66 (20 May 2023 10:59) (93/48 - 112/69)  BP(mean): --  RR: 17 (20 May 2023 10:59) (16 - 18)  SpO2: 96% (20 May 2023 10:59) (96% - 97%)    Parameters below as of 20 May 2023 10:59  Patient On (Oxygen Delivery Method): room air      I&O's Summary    19 May 2023 07:01  -  20 May 2023 07:00  --------------------------------------------------------  IN: 650 mL / OUT: 1950 mL / NET: -1300 mL    20 May 2023 07:01  -  20 May 2023 12:11  --------------------------------------------------------  IN: 370 mL / OUT: 350 mL / NET: 20 mL        PHYSICAL EXAM:  Gen: AOX3, NAD, lying flat in bed, speaking in full sentences, no labored breathing on RA  HEENT: AT/NC, no facial asymmetry  CV: RRR, +S1/S2  Lungs: no crackles, no wheezes  Abdomen: soft, no tenderness, + BS  Extremities: warm, no edema, no calf tenderness, no focal deficit     LABS:                        12.2   9.82  )-----------( 254      ( 20 May 2023 08:08 )             37.9     05-20    137  |  107  |  7   ----------------------------<  97  4.0   |  23  |  0.59    Ca    8.8      20 May 2023 08:08  Phos  2.3     05-20  Mg     1.9     05-20    TPro  8.1  /  Alb  4.8  /  TBili  0.6  /  DBili  x   /  AST  16  /  ALT  19  /  AlkPhos  59  05-18    LIVER FUNCTIONS - ( 18 May 2023 13:51 )  Alb: 4.8 g/dL / Pro: 8.1 g/dL / ALK PHOS: 59 U/L / ALT: 19 U/L / AST: 16 U/L / GGT: x           PT/INR - ( 18 May 2023 16:10 )   PT: 14.4 sec;   INR: 1.21          PTT - ( 18 May 2023 16:10 )  PTT:25.6 sec  CAPILLARY BLOOD GLUCOSE        Urinalysis Basic - ( 18 May 2023 13:51 )    Color: Yellow / Appearance: Clear / SG: >=1.030 / pH: x  Gluc: x / Ketone: >=80 mg/dL  / Bili: Negative / Urobili: 0.2 E.U./dL   Blood: x / Protein: 100 mg/dL / Nitrite: NEGATIVE   Leuk Esterase: NEGATIVE / RBC: < 5 /HPF / WBC < 5 /HPF   Sq Epi: x / Non Sq Epi: x / Bacteria: Present /HPF        MICRODATA:    Culture - Blood (collected 18 May 2023 14:10)  Source: .Blood Blood-Peripheral  Preliminary Report (19 May 2023 17:00):    No growth at 1 day.    Culture - Blood (collected 18 May 2023 13:55)  Source: .Blood Blood-Peripheral  Preliminary Report (19 May 2023 17:00):    No growth at 1 day.    Urinalysis with Rflx Culture (collected 18 May 2023 13:51)        RADIOLOGY/OTHER STUDIES:

## 2023-05-20 NOTE — CONSULT NOTE ADULT - ASSESSMENT
26 y/o F with no reported PMH s/p Lap appendectomy    Continue on ATB per primary team, currently on Pip/Tazo. Leucocytosis resolved  Patient noted hypotensive, asymptomatic. Get repeat.  Replete electrolytes  IS, OOB  DVT ppx per protocol

## 2023-05-20 NOTE — PROGRESS NOTE ADULT - ASSESSMENT
28yo Female pt with no PMH/PSx presents to the ED on 5/18 with x1 day history of RLQ pain associated with nausea, vomiting, and diarrhea.  Pt afebrile with low grade temp of 99.9 oral, nontachycardic, normotensive, and satting well on RA. Labs significant for WBC 26.43, Hb 14.2, and an unremarkable chem panel. CTAP with PO/IV contrast demonstrates acute retrocecal appendicitis. Now s/p lap appy (gangrenous) (5/18).     Reg diet  Zosyn  Pain/nausea control PRN  HSQ/SCDs/OOB/IS  AM labs
26yo Female POD2 lap appy (gangrenous) (5/18).     Reg diet  Zosyn  Pain/nausea control PRN  HSQ/SCDs/OOB/IS  AM labs  Dispo: discharge home with no needs

## 2023-05-20 NOTE — PROGRESS NOTE ADULT - SUBJECTIVE AND OBJECTIVE BOX
INTERVAL HPI/OVERNIGHT EVENTS:    STATUS POST:      POST OPERATIVE DAY #:     SUBJECTIVE:      heparin   Injectable 5000 Unit(s) SubCutaneous every 8 hours  piperacillin/tazobactam IVPB.. 3.375 Gram(s) IV Intermittent every 8 hours      Vital Signs Last 24 Hrs  T(C): 36.7 (20 May 2023 05:34), Max: 37.4 (19 May 2023 20:10)  T(F): 98.1 (20 May 2023 05:34), Max: 99.3 (19 May 2023 20:10)  HR: 84 (20 May 2023 05:34) (77 - 93)  BP: 107/69 (20 May 2023 05:34) (104/68 - 119/61)  BP(mean): 81 (19 May 2023 09:00) (80 - 84)  RR: 18 (20 May 2023 05:34) (16 - 20)  SpO2: 96% (20 May 2023 05:34) (96% - 99%)    Parameters below as of 20 May 2023 05:34  Patient On (Oxygen Delivery Method): room air      I&O's Detail    19 May 2023 07:01  -  20 May 2023 07:00  --------------------------------------------------------  IN:    IV PiggyBack: 250 mL    Oral Fluid: 400 mL  Total IN: 650 mL    OUT:    Voided (mL): 1950 mL  Total OUT: 1950 mL    Total NET: -1300 mL          General: NAD, resting comfortably in bed  C/V: NSR  Pulm: Nonlabored breathing, no respiratory distress  Abd: soft, NT/ND.  Extrem: WWP, no edema, SCDs in place  Drains:  Marco [place]:      LABS:                        12.1   20.57 )-----------( 238      ( 19 May 2023 05:28 )             35.9     05-19    138  |  104  |  7   ----------------------------<  148<H>  4.2   |  24  |  0.50    Ca    8.7      19 May 2023 05:28  Phos  2.6     05-19  Mg     2.2     05-19    TPro  8.1  /  Alb  4.8  /  TBili  0.6  /  DBili  x   /  AST  16  /  ALT  19  /  AlkPhos  59  05-18    PT/INR - ( 18 May 2023 16:10 )   PT: 14.4 sec;   INR: 1.21          PTT - ( 18 May 2023 16:10 )  PTT:25.6 sec  Urinalysis Basic - ( 18 May 2023 13:51 )    Color: Yellow / Appearance: Clear / SG: >=1.030 / pH: x  Gluc: x / Ketone: >=80 mg/dL  / Bili: Negative / Urobili: 0.2 E.U./dL   Blood: x / Protein: 100 mg/dL / Nitrite: NEGATIVE   Leuk Esterase: NEGATIVE / RBC: < 5 /HPF / WBC < 5 /HPF   Sq Epi: x / Non Sq Epi: x / Bacteria: Present /HPF        RADIOLOGY & ADDITIONAL STUDIES:   INTERVAL HPI/OVERNIGHT EVENTS: RON    STATUS POST: laparoscopic appendectomy    POST OPERATIVE DAY #: 2    SUBJECTIVE: Patient seen and examined at bedside with chief. States pain has largely resolved, able to alba PO without n/v, denies cp, sob. OOBA. Passing gas consistently.      heparin   Injectable 5000 Unit(s) SubCutaneous every 8 hours  piperacillin/tazobactam IVPB.. 3.375 Gram(s) IV Intermittent every 8 hours      Vital Signs Last 24 Hrs  T(C): 36.7 (20 May 2023 05:34), Max: 37.4 (19 May 2023 20:10)  T(F): 98.1 (20 May 2023 05:34), Max: 99.3 (19 May 2023 20:10)  HR: 84 (20 May 2023 05:34) (77 - 93)  BP: 107/69 (20 May 2023 05:34) (104/68 - 119/61)  BP(mean): 81 (19 May 2023 09:00) (80 - 84)  RR: 18 (20 May 2023 05:34) (16 - 20)  SpO2: 96% (20 May 2023 05:34) (96% - 99%)    Parameters below as of 20 May 2023 05:34  Patient On (Oxygen Delivery Method): room air      I&O's Detail    19 May 2023 07:01  -  20 May 2023 07:00  --------------------------------------------------------  IN:    IV PiggyBack: 250 mL    Oral Fluid: 400 mL  Total IN: 650 mL    OUT:    Voided (mL): 1950 mL  Total OUT: 1950 mL    Total NET: -1300 mL          General: NAD, resting comfortably in bed  C/V: NSR  Pulm: Nonlabored breathing, no respiratory distress  Abd: soft, NT/ND. Incisions c/d/i.  Extrem: WWP, no edema, SCDs in place        LABS:                        12.1   20.57 )-----------( 238      ( 19 May 2023 05:28 )             35.9     05-19    138  |  104  |  7   ----------------------------<  148<H>  4.2   |  24  |  0.50    Ca    8.7      19 May 2023 05:28  Phos  2.6     05-19  Mg     2.2     05-19    TPro  8.1  /  Alb  4.8  /  TBili  0.6  /  DBili  x   /  AST  16  /  ALT  19  /  AlkPhos  59  05-18    PT/INR - ( 18 May 2023 16:10 )   PT: 14.4 sec;   INR: 1.21          PTT - ( 18 May 2023 16:10 )  PTT:25.6 sec  Urinalysis Basic - ( 18 May 2023 13:51 )    Color: Yellow / Appearance: Clear / SG: >=1.030 / pH: x  Gluc: x / Ketone: >=80 mg/dL  / Bili: Negative / Urobili: 0.2 E.U./dL   Blood: x / Protein: 100 mg/dL / Nitrite: NEGATIVE   Leuk Esterase: NEGATIVE / RBC: < 5 /HPF / WBC < 5 /HPF   Sq Epi: x / Non Sq Epi: x / Bacteria: Present /HPF        RADIOLOGY & ADDITIONAL STUDIES:

## 2023-05-20 NOTE — DISCHARGE NOTE NURSING/CASE MANAGEMENT/SOCIAL WORK - PATIENT PORTAL LINK FT
You can access the FollowMyHealth Patient Portal offered by [facility] by registering at the following website: [email or web address]. By joining [facility]’s FollowMyHealth portal, you will also be able to view your health information using other applications (apps) compatible with our system.

## 2023-05-20 NOTE — DISCHARGE NOTE NURSING/CASE MANAGEMENT/SOCIAL WORK - NSDCFUADDAPPT_GEN_ALL_CORE_FT
Please follow up with Dr. Deluca in one week; you may call the office to make an appointment at your earliest convenience at [phone].

## 2023-05-23 LAB
CULTURE RESULTS: SIGNIFICANT CHANGE UP
CULTURE RESULTS: SIGNIFICANT CHANGE UP
SPECIMEN SOURCE: SIGNIFICANT CHANGE UP
SPECIMEN SOURCE: SIGNIFICANT CHANGE UP

## 2023-05-24 DIAGNOSIS — K38.1 APPENDICULAR CONCRETIONS: ICD-10-CM

## 2023-05-24 DIAGNOSIS — K35.80 UNSPECIFIED ACUTE APPENDICITIS: ICD-10-CM

## 2023-05-24 DIAGNOSIS — Z91.040 LATEX ALLERGY STATUS: ICD-10-CM

## 2023-05-24 DIAGNOSIS — K35.32 ACUTE APPENDICITIS WITH PERFORATION, LOCALIZED PERITONITIS, AND GANGRENE, WITHOUT ABSCESS: ICD-10-CM

## 2023-05-24 DIAGNOSIS — F12.90 CANNABIS USE, UNSPECIFIED, UNCOMPLICATED: ICD-10-CM

## 2023-06-07 LAB — SURGICAL PATHOLOGY STUDY: SIGNIFICANT CHANGE UP

## (undated) DEVICE — ENDOCATCH 10MM SPECIMEN POUCH

## (undated) DEVICE — WARMING BLANKET UPPER ADULT

## (undated) DEVICE — GLV 6.5 PROTEXIS (WHITE)

## (undated) DEVICE — VENODYNE/SCD SLEEVE CALF MEDIUM

## (undated) DEVICE — ELCTR CORD FOOTSWITCH 1PLR LAPSCP 10FT

## (undated) DEVICE — STAPLER COVIDIEN ENDO GIA STANDARD HANDLE

## (undated) DEVICE — TROCAR COVIDIEN VERSAPORT BLADELESS OPTICAL 5MM STANDARD

## (undated) DEVICE — TROCAR COVIDIEN VERSAONE FIXATION CANNULA 5MM

## (undated) DEVICE — SHEARS HARMONIC ACE 5MM X 36CM CURVED TIP

## (undated) DEVICE — SUT MONOCRYL 4-0 18" PS-2

## (undated) DEVICE — SUT VICRYL 0 27" UR-6

## (undated) DEVICE — PACK GENERAL LAPAROSCOPY

## (undated) DEVICE — D HELP - CLEARVIEW CLEARIFY SYSTEM

## (undated) DEVICE — TROCAR COVIDIEN VERSAONE BLUNT TIP HASSAN 12MM

## (undated) DEVICE — GLV 7 PROTEXIS (WHITE)

## (undated) DEVICE — DRAPE 1/2 SHEET 40X57"

## (undated) DEVICE — TUBING STRYKER PNEUMOCLEAR HIGH FLOW

## (undated) DEVICE — POSITIONER FOAM EGG CRATE ULNAR 2PCS (PINK)

## (undated) DEVICE — Device

## (undated) DEVICE — DRSG DERMABOND 0.7ML

## (undated) DEVICE — SOL IRR BAG NS 0.9% 3000ML

## (undated) DEVICE — TROCAR APPLIED MEDICAL KII BALLOON BLUNT TIP 12MM X 100MM

## (undated) DEVICE — TIP METZENBAUM SCISSOR MONOPOLAR ENDOCUT (ORANGE)

## (undated) DEVICE — ELCTR BOVIE PENCIL BLADE 10FT

## (undated) DEVICE — BLADE SURGICAL #15 CARBON

## (undated) DEVICE — TROCAR COVIDIEN VERSAPORT BLADELESS OPTICAL 12MM STANDARD

## (undated) DEVICE — DRAPE 3/4 SHEET 52X76"